# Patient Record
(demographics unavailable — no encounter records)

---

## 2017-12-06 PROBLEM — G44.229 CHRONIC TENSION-TYPE HEADACHE, NOT INTRACTABLE: Status: ACTIVE | Noted: 2017-12-06

## 2019-08-24 PROCEDURE — 87081 CULTURE SCREEN ONLY: CPT | Performed by: PHYSICIAN ASSISTANT

## 2021-08-23 NOTE — ED PROVIDER NOTES
Patient Seen in: [facility] Emergency Department      History   Patient presents with:  Eval-P    Stated Complaint:     HPI/Subjective:   HPI    Patient is a 15year-old nonbinary patient presenting from [facility] for concern for suicidal ideation or gallops. Abdomen: Soft, nontender, nondistended. Bowel sounds present throughout. Extremities: Warm and well perfused. Dermatologic exam: No rashes or lesions. Neurologic exam: Cranial nerves 2-12 grossly intact. Orthopedic exam: normal,from. pm    Follow-up:  No follow-up provider specified.         Medications Prescribed:  Current Discharge Medication List

## 2021-08-23 NOTE — ED NOTES
Left voice mail message for [facility] to make referral.    Ho Deutsch and spoke with Sal العراقي to follow-up on referral status. [facility] still needs to review packet.

## 2021-08-23 NOTE — ED INITIAL ASSESSMENT (HPI)
Patient here from [facility] for medical clearance for inpatient admission for SI with plan for overdose.

## 2021-08-23 NOTE — BH LEVEL OF CARE ASSESSMENT
Sushma Moncho #[id number] (01 year old) (Adm: 08/23/21)  [facility]  Cristiano Barker   [facility] Counselor       Level of Care Assessment      Signed   Date of Service:  8/23/2021 10:45 AM               Signed             Show:Clear all  [x]Manual[x]Template[]Copied with suicidal thoughts. Mom spoke to patient yesterday and pt admitted to having suicidal thoughts, and was not able to attend school today. Pt is taking naps throughout the day. Pt reports SI \"all day long\".  Per mom, all meds, knives, scissors, razors a of Removal of Access to Means: Per pt and mom, all med are locked and secure  Access to Firearm/Weapon: No  Discussion of Removal of Firearm/Weapon: denied  Do you have a firearm owner ID card?: No  Collateral for any access to means/firearms/weapons: mom Concerns r/t Abuse: No  Possible Abuse Reportable to[de-identified] Not appropriate for reporting to authorities     Mental Status Exam:         Disposition:     Assessment Summary:   Pt is a 15 y/o female, prefers \"Teeth\", any pronouns.  Pt was admitted to [facility] PHP pro

## 2021-08-23 NOTE — ED NOTES
Spoke with Alexandra's Mom who was agreeable to making referrals to [facility] and [facility]. Mom would prefer not making referral to [address] [place] [address] or any hospitals in the [facility]. Called [facility] and spoke with Gill Rangel to make referral over the phone.  Referral Packet was

## 2021-08-23 NOTE — ED NOTES
Spoke with Alexandra's Mom to inform her that Jaelyn Mend[facility] has still yet to review packet. Informed Mom that Jaelyn Mendosa had stated that there is possible bed availability at [facility]. Informed Mom that voice mail message was left for [facility].      Mom

## 2021-08-23 NOTE — ED NOTES
Spoke with Master Sellers at [address] stated that they may have a bed available on their Pediatric Unit. Typical ages are from 6-12 on the Pediatric Unit, but could possibly accommodate patient's who are 15.  Master Sellers asked for Referral Packet to

## 2021-08-24 NOTE — ED QUICK NOTES
Report received from [facility]. Pt received asleep on cart, arouses easily to verbal and tactile stimuli. Pt remained cooperative. Mother at bedside, to remain overnight.  Will continue to monitor patient

## 2021-08-24 NOTE — ED NOTES
Rachel confirming that pt will be transporting from ED at 7. [facility] reports nurse to nurse has been completed and pt is okay for transport. Attempted to follow up with RN - RN speaking to [facility] now as well.

## 2021-08-24 NOTE — ED NOTES
Updated pt that [facility] is starting to review pt. Mother stated she is okay with moving forward.

## 2021-08-24 NOTE — ED QUICK NOTES
Updated mom and patient regarding patient being accepted at [address] [place] [address] but will not be transferred till the morning. Patient teary in room. Night time meds given at this time.  Patient will be allowed to have plush blanket with her in room to help with her an

## 2021-11-10 PROBLEM — F33.9 MAJOR DEPRESSIVE DISORDER, RECURRENT (HCC): Status: ACTIVE | Noted: 2021-11-10
